# Patient Record
Sex: MALE | Race: WHITE | NOT HISPANIC OR LATINO | Employment: FULL TIME | URBAN - METROPOLITAN AREA
[De-identification: names, ages, dates, MRNs, and addresses within clinical notes are randomized per-mention and may not be internally consistent; named-entity substitution may affect disease eponyms.]

---

## 2023-11-24 PROBLEM — E66.812 CLASS 2 SEVERE OBESITY WITH SERIOUS COMORBIDITY AND BODY MASS INDEX (BMI) OF 37.0 TO 37.9 IN ADULT: Status: ACTIVE | Noted: 2023-05-03

## 2024-07-31 ENCOUNTER — OFFICE VISIT (OUTPATIENT)
Dept: FAMILY MEDICINE | Facility: CLINIC | Age: 54
End: 2024-07-31
Payer: COMMERCIAL

## 2024-07-31 VITALS
OXYGEN SATURATION: 99 % | WEIGHT: 210.56 LBS | SYSTOLIC BLOOD PRESSURE: 118 MMHG | DIASTOLIC BLOOD PRESSURE: 82 MMHG | RESPIRATION RATE: 16 BRPM | HEIGHT: 67 IN | BODY MASS INDEX: 33.05 KG/M2 | HEART RATE: 71 BPM

## 2024-07-31 DIAGNOSIS — Z12.5 SCREENING PSA (PROSTATE SPECIFIC ANTIGEN): ICD-10-CM

## 2024-07-31 DIAGNOSIS — R79.89 LOW TESTOSTERONE IN MALE: Primary | ICD-10-CM

## 2024-07-31 DIAGNOSIS — E03.8 SUBCLINICAL HYPOTHYROIDISM: ICD-10-CM

## 2024-07-31 DIAGNOSIS — Z13.220 ENCOUNTER FOR LIPID SCREENING FOR CARDIOVASCULAR DISEASE: ICD-10-CM

## 2024-07-31 DIAGNOSIS — G37.9 DEMYELINATING DISEASE OF CENTRAL NERVOUS SYSTEM, UNSPECIFIED: ICD-10-CM

## 2024-07-31 DIAGNOSIS — E66.9 CLASS 1 OBESITY WITH SERIOUS COMORBIDITY AND BODY MASS INDEX (BMI) OF 32.0 TO 32.9 IN ADULT, UNSPECIFIED OBESITY TYPE: ICD-10-CM

## 2024-07-31 DIAGNOSIS — Z13.6 ENCOUNTER FOR LIPID SCREENING FOR CARDIOVASCULAR DISEASE: ICD-10-CM

## 2024-07-31 DIAGNOSIS — M54.16 LUMBAR RADICULOPATHY, CHRONIC: ICD-10-CM

## 2024-07-31 DIAGNOSIS — I83.891 VARICOSE VEINS OF LEG WITH SWELLING, RIGHT: ICD-10-CM

## 2024-07-31 DIAGNOSIS — R73.03 PREDIABETES: ICD-10-CM

## 2024-07-31 DIAGNOSIS — J30.89 NON-SEASONAL ALLERGIC RHINITIS, UNSPECIFIED TRIGGER: ICD-10-CM

## 2024-07-31 DIAGNOSIS — E55.9 VITAMIN D DEFICIENCY: ICD-10-CM

## 2024-07-31 DIAGNOSIS — R22.41 LOCALIZED SWELLING OF RIGHT LOWER LEG: ICD-10-CM

## 2024-07-31 PROBLEM — E66.812 CLASS 2 SEVERE OBESITY WITH SERIOUS COMORBIDITY AND BODY MASS INDEX (BMI) OF 37.0 TO 37.9 IN ADULT: Status: RESOLVED | Noted: 2023-05-03 | Resolved: 2024-07-31

## 2024-07-31 PROBLEM — E66.01 CLASS 2 SEVERE OBESITY WITH SERIOUS COMORBIDITY AND BODY MASS INDEX (BMI) OF 37.0 TO 37.9 IN ADULT: Status: RESOLVED | Noted: 2023-05-03 | Resolved: 2024-07-31

## 2024-07-31 PROCEDURE — 1159F MED LIST DOCD IN RCRD: CPT | Mod: CPTII,S$GLB,, | Performed by: STUDENT IN AN ORGANIZED HEALTH CARE EDUCATION/TRAINING PROGRAM

## 2024-07-31 PROCEDURE — 3074F SYST BP LT 130 MM HG: CPT | Mod: CPTII,S$GLB,, | Performed by: STUDENT IN AN ORGANIZED HEALTH CARE EDUCATION/TRAINING PROGRAM

## 2024-07-31 PROCEDURE — 99999 PR PBB SHADOW E&M-EST. PATIENT-LVL IV: CPT | Mod: PBBFAC,,, | Performed by: STUDENT IN AN ORGANIZED HEALTH CARE EDUCATION/TRAINING PROGRAM

## 2024-07-31 PROCEDURE — 3008F BODY MASS INDEX DOCD: CPT | Mod: CPTII,S$GLB,, | Performed by: STUDENT IN AN ORGANIZED HEALTH CARE EDUCATION/TRAINING PROGRAM

## 2024-07-31 PROCEDURE — 3079F DIAST BP 80-89 MM HG: CPT | Mod: CPTII,S$GLB,, | Performed by: STUDENT IN AN ORGANIZED HEALTH CARE EDUCATION/TRAINING PROGRAM

## 2024-07-31 PROCEDURE — 99214 OFFICE O/P EST MOD 30 MIN: CPT | Mod: S$GLB,,, | Performed by: STUDENT IN AN ORGANIZED HEALTH CARE EDUCATION/TRAINING PROGRAM

## 2024-07-31 RX ORDER — FLUTICASONE PROPIONATE 50 MCG
1 SPRAY, SUSPENSION (ML) NASAL DAILY
Qty: 16 G | Refills: 2 | Status: SHIPPED | OUTPATIENT
Start: 2024-07-31

## 2024-07-31 NOTE — PROGRESS NOTES
Subjective:       Patient ID: Antonio Au Jr is a 54 y.o. male.    Chief Complaint: Follow-up (Patient is here today for a follow up visit. )    Pt here for follow-up    Testosterone def: Pt is doing well with meds. He is due for labs.     Obesity/pre-DM: he lost 26lb since last visit. Last A1c 5.6     Vit D def: Pt currently on vit D supplements.    Chronic lumbar back pain. He has a history of transverse myelitis. He is followed by neurosurgery. He takes gabapentin 100mg TID, failed lyrica.    He has a tender area on his right inner thigh just above the knee. He has a varicose vein in the area. It is painful to touch and if he gets up after prolonged sitting. No erythema or warmth to touch. It has been this way for about 1 month.     Review of Systems   Constitutional:  Negative for chills and fever.   HENT:  Negative for congestion and sore throat.    Respiratory:  Negative for cough and shortness of breath.    Cardiovascular:  Negative for chest pain.   Gastrointestinal:  Negative for abdominal pain, diarrhea, nausea and vomiting.   Genitourinary:  Negative for dysuria and hematuria.   Musculoskeletal:  Positive for arthralgias.   Neurological:  Positive for numbness. Negative for dizziness, syncope and light-headedness.       Objective:      Physical Exam  Vitals reviewed.   Constitutional:       General: He is not in acute distress.  HENT:      Head: Normocephalic and atraumatic.      Mouth/Throat:      Mouth: Mucous membranes are moist.   Eyes:      Conjunctiva/sclera: Conjunctivae normal.   Cardiovascular:      Rate and Rhythm: Normal rate and regular rhythm.      Heart sounds: Normal heart sounds. No murmur heard.  Pulmonary:      Effort: Pulmonary effort is normal. No respiratory distress.      Breath sounds: Normal breath sounds.   Musculoskeletal:         General: No deformity.      Cervical back: Neck supple.      Comments: Suspected palpable venous cord right inner thigh just superior to knee    Neurological:      Mental Status: He is alert and oriented to person, place, and time.   Psychiatric:         Mood and Affect: Mood normal.         Behavior: Behavior normal.         Assessment:       1. Low testosterone in male    2. Lumbar radiculopathy, chronic    3. Vitamin D deficiency    4. Class 1 obesity with serious comorbidity and body mass index (BMI) of 32.0 to 32.9 in adult, unspecified obesity type    5. Prediabetes    6. Encounter for lipid screening for cardiovascular disease    7. Screening PSA (prostate specific antigen)    8. Subclinical hypothyroidism    9. Demyelinating disease of central nervous system, unspecified    10. Localized swelling of right lower leg    11. Varicose veins of leg with swelling, right    12. Non-seasonal allergic rhinitis, unspecified trigger          Plan:           1. Low testosterone in male  -     CBC Auto Differential; Future; Expected date: 07/31/2024  -     Comprehensive Metabolic Panel; Future; Expected date: 07/31/2024  -     TSH; Future; Expected date: 07/31/2024  -     Testosterone; Future; Expected date: 07/31/2024    2. Lumbar radiculopathy, chronic    3. Vitamin D deficiency  -     CBC Auto Differential; Future; Expected date: 07/31/2024  -     Comprehensive Metabolic Panel; Future; Expected date: 07/31/2024  -     TSH; Future; Expected date: 07/31/2024  -     Vitamin D; Future; Expected date: 07/31/2024    4. Class 1 obesity with serious comorbidity and body mass index (BMI) of 32.0 to 32.9 in adult, unspecified obesity type  -     Hemoglobin A1C; Future; Expected date: 07/31/2024  -     Lipid Panel; Future; Expected date: 07/31/2024  -     TSH; Future; Expected date: 07/31/2024    5. Prediabetes  -     CBC Auto Differential; Future; Expected date: 07/31/2024  -     Comprehensive Metabolic Panel; Future; Expected date: 07/31/2024  -     Hemoglobin A1C; Future; Expected date: 07/31/2024  -     Lipid Panel; Future; Expected date: 07/31/2024    6. Encounter  for lipid screening for cardiovascular disease  -     Lipid Panel; Future; Expected date: 07/31/2024    7. Screening PSA (prostate specific antigen)  -     PSA, Screening; Future; Expected date: 07/31/2024    8. Subclinical hypothyroidism  -     CBC Auto Differential; Future; Expected date: 07/31/2024  -     Comprehensive Metabolic Panel; Future; Expected date: 07/31/2024  -     TSH; Future; Expected date: 07/31/2024    9. Demyelinating disease of central nervous system, unspecified    10. Localized swelling of right lower leg  -     US Lower Extremity Veins Right; Future; Expected date: 07/31/2024    11. Varicose veins of leg with swelling, right  -     US Lower Extremity Veins Right; Future; Expected date: 07/31/2024    12. Non-seasonal allergic rhinitis, unspecified trigger  -     fluticasone propionate (FLONASE) 50 mcg/actuation nasal spray; Use 1 spray (50 mcg total) by Each Nostril route once daily.  Dispense: 16 g; Refill: 2      Labs as ordered  Cont current meds; add flonase  Check venous US to r/o DVT  Follow-up neurosurgery as scheduled  RTC 6 months or sooner if needed

## 2024-08-01 ENCOUNTER — HOSPITAL ENCOUNTER (OUTPATIENT)
Dept: RADIOLOGY | Facility: HOSPITAL | Age: 54
Discharge: HOME | End: 2024-08-01
Attending: STUDENT IN AN ORGANIZED HEALTH CARE EDUCATION/TRAINING PROGRAM
Payer: COMMERCIAL

## 2024-08-01 DIAGNOSIS — M71.21 POPLITEAL CYST, RIGHT: Primary | ICD-10-CM

## 2024-08-01 DIAGNOSIS — I83.891 VARICOSE VEINS OF LEG WITH SWELLING, RIGHT: ICD-10-CM

## 2024-08-01 DIAGNOSIS — R22.41 LOCALIZED SWELLING OF RIGHT LOWER LEG: ICD-10-CM

## 2024-08-01 PROCEDURE — 93971 EXTREMITY STUDY: CPT | Mod: TC,RT

## 2024-08-01 NOTE — PROGRESS NOTES
Please inform the pt US did not show any bloot clot. He does have a popliteal cyst behind the right knee which is sometimes drained by ortho. Would he like me to place a referral?

## 2024-08-02 ENCOUNTER — TELEPHONE (OUTPATIENT)
Dept: FAMILY MEDICINE | Facility: CLINIC | Age: 54
End: 2024-08-02
Payer: COMMERCIAL

## 2024-08-09 DIAGNOSIS — M25.561 ACUTE PAIN OF RIGHT KNEE: Primary | ICD-10-CM

## 2024-08-14 ENCOUNTER — HOSPITAL ENCOUNTER (OUTPATIENT)
Dept: RADIOLOGY | Facility: HOSPITAL | Age: 54
Discharge: HOME | End: 2024-08-14
Attending: NURSE PRACTITIONER
Payer: COMMERCIAL

## 2024-08-14 DIAGNOSIS — M25.561 ACUTE PAIN OF RIGHT KNEE: ICD-10-CM

## 2024-08-14 PROCEDURE — 73562 X-RAY EXAM OF KNEE 3: CPT | Mod: TC,RT

## 2024-08-15 ENCOUNTER — OFFICE VISIT (OUTPATIENT)
Dept: ORTHOPEDICS | Facility: CLINIC | Age: 54
End: 2024-08-15
Payer: COMMERCIAL

## 2024-08-15 DIAGNOSIS — R22.41 LOCALIZED SWELLING, MASS AND LUMP, RIGHT LOWER LIMB: ICD-10-CM

## 2024-08-15 DIAGNOSIS — M25.561 ACUTE PAIN OF RIGHT KNEE: Primary | ICD-10-CM

## 2024-08-15 PROCEDURE — 1159F MED LIST DOCD IN RCRD: CPT | Mod: CPTII,S$GLB,, | Performed by: NURSE PRACTITIONER

## 2024-08-15 PROCEDURE — 1160F RVW MEDS BY RX/DR IN RCRD: CPT | Mod: CPTII,S$GLB,, | Performed by: NURSE PRACTITIONER

## 2024-08-15 PROCEDURE — 3044F HG A1C LEVEL LT 7.0%: CPT | Mod: CPTII,S$GLB,, | Performed by: NURSE PRACTITIONER

## 2024-08-15 PROCEDURE — 99999 PR PBB SHADOW E&M-EST. PATIENT-LVL II: CPT | Mod: PBBFAC,,, | Performed by: NURSE PRACTITIONER

## 2024-08-15 PROCEDURE — 99203 OFFICE O/P NEW LOW 30 MIN: CPT | Mod: S$GLB,,, | Performed by: NURSE PRACTITIONER

## 2024-08-15 NOTE — PROGRESS NOTES
Subjective:     Antonio Au Jr. is a 54 y.o. male presents for evaluation and treatment for a right knee mass/swelling. He is self referred.  He states onset has been over the past 6 months or so. He states that he notes an enlarging soft tissue mass over the medial posterior knee. He states that he was not having much pain with early onset but the knee has been painful over the past month in the area of the swelling in the medial posterior knee. He had a previous ultrasound done on 08/01/24 showing changes consistent with a bakers cyst with no DVT seen.  He presents and rates his pain as 5/10. The pain's location is over the medial posterior knee. He states that he has a medial protrusion that has enlarged over the past few weeks. He reports increased pain with flexion. He describes the symptoms as throbbing. He states that the symptoms worsen with activity, ROM and weight bearing. He denies any locking. The patient can bend and straighten the knee fully due to pain. He is noted with a limp.      Outside reports reviewed: previous ultrasound     Medical History:  Past Medical History:   Diagnosis Date    Transverse myelitis        Surgical History:  No past surgical history on file.    Family History:  Family History   Problem Relation Name Age of Onset    Cancer Mother          lymphoma    Coronary artery disease Mother      Cancer Father          spinal cancer    Heart disease Father          triple bypass       Allergies:   Review of patient's allergies indicates:  No Known Allergies        Review of Systems   Constitutional: Negative.  Negative for fever.   Musculoskeletal:  Positive for RT knee joint pain.   Skin: Negative.    Neurological: Negative.    Psychiatric/Behavioral: Negative.     All other systems reviewed and are negative.      Objective:      NVI  General :   alert, appears stated age, and cooperative   Gait: Antalgic   Right Lower Extremity  Hip Palpation:  no tenderness over the greater  trochanters   Hip ROM: 100% of normal    Knee Effusion:  trace   swelling:  Posterior medial knee soft tissue swelling/mass. Mass protruded with flexion over the medial knee.    Knee ROM:  5 to 115 degrees with mild subpatellar crepitance. Pain with forced flexion-posterior.    Patella:  Patella does track normally.  Patellar apprehension test: negative  Patellar compression test: negative   Tenderness: Medial knee joint line, posterior   Stability:  Lachman's test: negative  Posterior drawer: negative  Medial collateral ligament: negative  Lateral collateral ligament: negative     Sangeeta Test:  negative   Natasha's Test:  Guarded-   Sensation:   intact to light touch   Pulses: normal DP and PT pulses.       Contralateral knee was without deficit.  Brisk cap refill.      Imaging    Us RT lower extremity done 08/01/24:  The common femoral, superficial femoral and popliteal veins show normal flow, compression and augmentation.  5.7 x 1.4 x 3 cm popliteal cyst    Radiographs RT knee 2 V was ordered and reviewed from today  No fracture. Joint spaces are maintained. Minimal medial compartment osteophytes. No bone lesion, erosion or periosteal reaction detected     Assessment:      Right knee pain, bakers cyst vs other swelling/mass     Plan:         Radiographs were negative. Previous ultrasound- suspected bakers cyst. Will get MRI RT Knee to evaluate the etiology of the soft tissue swelling/mass over the medial posterior knee.   2.   Start directed physician guided exercises for ROM and strengthening for the RT knee.  3.   Ice and compression recommended.   4.   WBAT. Limit Stairs and squatting.   5.   Ibuprofen as directed.   6.   Follow up: Post MRI for review.   7.   He had no further questions.

## 2024-08-16 ENCOUNTER — TELEPHONE (OUTPATIENT)
Dept: ORTHOPEDICS | Facility: CLINIC | Age: 54
End: 2024-08-16
Payer: COMMERCIAL

## 2024-09-19 ENCOUNTER — HOSPITAL ENCOUNTER (OUTPATIENT)
Dept: RADIOLOGY | Facility: HOSPITAL | Age: 54
Discharge: HOME | End: 2024-09-19
Attending: NURSE PRACTITIONER
Payer: COMMERCIAL

## 2024-09-19 DIAGNOSIS — R22.41 LOCALIZED SWELLING, MASS AND LUMP, RIGHT LOWER LIMB: ICD-10-CM

## 2024-09-19 DIAGNOSIS — M25.561 ACUTE PAIN OF RIGHT KNEE: ICD-10-CM

## 2024-09-19 PROCEDURE — 73721 MRI JNT OF LWR EXTRE W/O DYE: CPT | Mod: TC,RT

## 2024-10-22 ENCOUNTER — TELEPHONE (OUTPATIENT)
Dept: ORTHOPEDICS | Facility: CLINIC | Age: 54
End: 2024-10-22
Payer: COMMERCIAL

## 2024-10-22 NOTE — TELEPHONE ENCOUNTER
Pt called in regards to his MRI results and discussed. He states that he will continue current treatment plan. He will notify me if symptoms worsen.

## 2024-11-07 DIAGNOSIS — M54.16 LUMBAR RADICULOPATHY, CHRONIC: ICD-10-CM

## 2024-11-07 DIAGNOSIS — R79.89 LOW TESTOSTERONE IN MALE: ICD-10-CM

## 2024-11-07 RX ORDER — TESTOSTERONE CYPIONATE 200 MG/ML
100 INJECTION, SOLUTION INTRAMUSCULAR
Qty: 1 ML | Refills: 2 | Status: SHIPPED | OUTPATIENT
Start: 2024-11-07 | End: 2025-05-08

## 2024-11-07 RX ORDER — GABAPENTIN 100 MG/1
100 CAPSULE ORAL 3 TIMES DAILY
Qty: 90 CAPSULE | Refills: 5 | Status: SHIPPED | OUTPATIENT
Start: 2024-11-07 | End: 2025-05-06

## 2024-11-07 NOTE — TELEPHONE ENCOUNTER
No care due was identified.  Health [facility] Embedded Care Due Messages. Reference number: [id number].   11/07/2024 11:42:44 AM CST

## 2024-11-07 NOTE — TELEPHONE ENCOUNTER
No care due was identified.  Health [facility] Embedded Care Due Messages. Reference number: [id number].   11/07/2024 11:38:23 AM CST

## 2024-11-08 ENCOUNTER — TELEPHONE (OUTPATIENT)
Dept: FAMILY MEDICINE | Facility: CLINIC | Age: 54
End: 2024-11-08
Payer: COMMERCIAL

## 2024-11-08 ENCOUNTER — OFFICE VISIT (OUTPATIENT)
Dept: ORTHOPEDICS | Facility: CLINIC | Age: 54
End: 2024-11-08
Payer: COMMERCIAL

## 2024-11-08 VITALS
WEIGHT: 210.56 LBS | SYSTOLIC BLOOD PRESSURE: 136 MMHG | DIASTOLIC BLOOD PRESSURE: 80 MMHG | BODY MASS INDEX: 33.05 KG/M2 | HEIGHT: 67 IN | OXYGEN SATURATION: 96 % | HEART RATE: 67 BPM

## 2024-11-08 DIAGNOSIS — M71.21 BAKER'S CYST OF KNEE, RIGHT: ICD-10-CM

## 2024-11-08 DIAGNOSIS — M25.461 KNEE EFFUSION, RIGHT: ICD-10-CM

## 2024-11-08 DIAGNOSIS — M25.561 ACUTE PAIN OF RIGHT KNEE: Primary | ICD-10-CM

## 2024-11-08 DIAGNOSIS — S83.241A ACUTE MEDIAL MENISCUS TEAR OF RIGHT KNEE, INITIAL ENCOUNTER: ICD-10-CM

## 2024-11-08 PROCEDURE — 99999 PR PBB SHADOW E&M-EST. PATIENT-LVL III: CPT | Mod: PBBFAC,,, | Performed by: NURSE PRACTITIONER

## 2024-11-08 RX ORDER — TRIAMCINOLONE ACETONIDE 40 MG/ML
40 INJECTION, SUSPENSION INTRA-ARTICULAR; INTRAMUSCULAR
Status: DISCONTINUED | OUTPATIENT
Start: 2024-11-08 | End: 2024-11-08 | Stop reason: HOSPADM

## 2024-11-08 RX ADMIN — TRIAMCINOLONE ACETONIDE 40 MG: 40 INJECTION, SUSPENSION INTRA-ARTICULAR; INTRAMUSCULAR at 08:11

## 2024-11-08 NOTE — PROCEDURES
Large Joint Aspiration/Injection: R knee    Date/Time: 11/8/2024 8:15 AM    Performed by: Yassine Herrrea NP  Authorized by: Yassine Herrera NP    Consent Done?:  Yes (Written)  Indications:  Pain and joint swelling  Prep: patient was prepped and draped in usual sterile fashion    Local anesthesia used?: No    Local anesthetic:  Lidocaine 1% without epinephrine  Anesthetic total (ml):  2      Details:  Needle Size:  22 G  Ultrasonic Guidance for needle placement?: No    Approach:  Lateral  Location:  Knee  Site:  R knee  Medications:  40 mg triamcinolone acetonide 40 mg/mL  Aspirate amount (mL):  3  Aspirate:  Clear  Patient tolerance:  Patient tolerated the procedure well with no immediate complications     Risks reviewed: pain, swelling, infection and nerve or tendon injury.     YANY Newman MA in room

## 2024-11-08 NOTE — TELEPHONE ENCOUNTER
----- Message from Luis sent at 2024 12:36 PM CST -----  Contact: self  Antonio Au Jr.  MRN: [id number]  : 1970  PCP: Serg Neves  Home Phone      Not on file.  Work Phone      Not on file.  Mobile          [phone]  Home Phone      Not on file.  Mobile          Not on file.      MESSAGE:   Pt states [facility]  ARON needs a P.A for his triamcinolone acetonide injection 40 mg

## 2024-11-08 NOTE — PROGRESS NOTES
Subjective:      Follow up: RT knee pain/MRI    Antonio Au Jr. is a 54 y.o. male presents for follow up for right knee pain, swelling and had a recent MRI showing a medial meniscal tear. He presents and states that the knee was doing better but twisted it recently. He is again having pain and localized swelling over the posterior knee. He rates his pain as 4/10. The pain's location is over the medial posterior knee. He reports increased pain with flexion. He states that the symptoms worsen with activity, ROM and weight bearing. He denies any locking. He is noted with a limp.      Outside reports reviewed: previous ultrasound negative     Medical History:       Past Medical History:   Diagnosis Date    Transverse myelitis           Surgical History:  No past surgical history on file.     Family History:         Family History   Problem Relation Name Age of Onset    Cancer Mother             lymphoma    Coronary artery disease Mother        Cancer Father             spinal cancer    Heart disease Father             triple bypass         Allergies:   Review of patient's allergies indicates:  No Known Allergies        Review of Systems   Constitutional: Negative.  Negative for fever.   Musculoskeletal:  Positive for RT knee joint pain.   Skin: Negative.    Neurological: Negative.    Psychiatric/Behavioral: Negative.     All other systems reviewed and are negative.      Objective:      NVI  General :   alert, appears stated age, and cooperative   Gait: Antalgic   Right Lower Extremity  Hip Palpation:  no tenderness over the greater trochanters   Hip ROM: 100% of normal    Knee Effusion:  trace   swelling:  Large bakers cyst   Knee ROM:  5 to 115 degrees with mild subpatellar crepitance.   Pain with forced flexion-posterior.    Patella:  Patella does track normally.  Patellar apprehension test: negative  Patellar compression test: negative   Tenderness: Medial knee joint line, posterior   Stability:  Lachman's test:  negative  Posterior drawer: negative  Medial collateral ligament: negative  Lateral collateral ligament: negative     Sangeeta Test:  negative   Natasha's Test:  Guarded- medial knee   Sensation:   intact to light touch   Pulses: normal DP and PT pulses.       Contralateral knee was without deficit.  Brisk cap refill.      Imaging     Us RT lower extremity done 08/01/24:  The common femoral, superficial femoral and popliteal veins show normal flow, compression and augmentation.  5.7 x 1.4 x 3 cm popliteal cyst     Radiographs RT knee 2 V was reviewed from 08/14/24  No fracture. Joint spaces are maintained. Minimal medial compartment osteophytes. No bone lesion, erosion or periosteal reaction detected     RT knee MRI reviewed from 09/19/24  Horizontal tear throughout body and posterior horn medial meniscus.     Intact lateral meniscus, ACL and PCL.  Intact MCL and LCL.  Intact quadriceps and patellar tendons.  No cartilage defects or bone marrow edema.  6 x 3 x 2 cm popliteal cyst and minimal joint effusion.     Impression:     Horizontal body-posterior horn medial meniscal tear     Small joint effusion, 6 cm popliteal cyst     Assessment:      Right knee pain, bakers cyst and medial meniscal tear     Plan:         Previous MRI RT Knee consistent with a medial meniscal tear and bakers cyst.   RT Knee steroid injection done, see procedure note.   3.   Referral to sports medicine for surgical evaluation.   4.   Continue directed physician guided exercises for ROM and strengthening for the RT knee.  5.   Ice and compression recommended.   6.   WBAT. Limit Stairs and squatting.   7.   Ibuprofen as previous.    8.   Follow up: Prn.  9.   He had no further questions.     PROCEDURE: RT Knee steroid injection  I have explained the risks, benefits, and alternatives of the procedure in detail.  The patient voices understanding and all questions have been answered.  The patient agrees to proceed as planned. So after I performed a  sterile prep of the skin in the normal fashion the right knee is injected using a 22 gauge needle from the lateral approach with a combination of 3cc 1% plain lidocaine and 40 mg of kenalog  The patient is cautioned and immediate relief of pain is secondary to the local anesthetic and will be temporary.  After the anesthetic wears off there may be a increase in pain that may last for a few hours or a few days and they should use ice to help alleviate this flair up of pain.

## 2024-11-15 ENCOUNTER — TELEPHONE (OUTPATIENT)
Dept: SPORTS MEDICINE | Facility: CLINIC | Age: 54
End: 2024-11-15
Payer: COMMERCIAL

## 2024-11-15 NOTE — TELEPHONE ENCOUNTER
----- Message from Raymundo sent at 11/15/2024  1:41 PM CST -----  Regarding: RE: referral  Lvm x2  ----- Message -----  From: Suzan Lyons MA  Sent: 11/15/2024   1:34 PM CST  To: Raymundo Beth  Subject: FW: referral                                       ----- Message -----  From: Serg Espinosa  Sent: 11/15/2024   6:50 AM CST  To: Suzan Lyons MA  Subject: FW: referral                                     [facility]  ----- Message -----  From: Yassine Herrera NP  Sent: 11/8/2024   9:15 AM CST  To: Serg Espinosa  Subject: referral                                         RT knee  Mri - medial meniscal tear and large bakers cyst

## 2024-12-06 ENCOUNTER — TELEPHONE (OUTPATIENT)
Dept: SPORTS MEDICINE | Facility: CLINIC | Age: 54
End: 2024-12-06
Payer: COMMERCIAL

## 2024-12-06 NOTE — TELEPHONE ENCOUNTER
----- Message from Efrain Mares sent at 12/5/2024  3:49 PM CST -----    ----- Message -----  From: Caro Mae  Sent: 12/5/2024   3:36 PM CST  To: Swapnil SHARIF Staff    Type:  Patient Returning Call    Who Called: pt  Who Left Message for Patient: Suzan Lyons MA  Does the patient know what this is regarding?: yes  Would the patient rather a call back or a response via [facility]? call  Best Call Back Number:  [phone]  Additional Information:

## 2024-12-19 DIAGNOSIS — R79.89 LOW TESTOSTERONE IN MALE: ICD-10-CM

## 2024-12-19 DIAGNOSIS — E55.9 VITAMIN D DEFICIENCY: ICD-10-CM

## 2024-12-19 RX ORDER — TESTOSTERONE CYPIONATE 200 MG/ML
100 INJECTION, SOLUTION INTRAMUSCULAR
Qty: 1 ML | Refills: 1 | Status: SHIPPED | OUTPATIENT
Start: 2024-12-19 | End: 2025-06-19

## 2024-12-19 NOTE — TELEPHONE ENCOUNTER
No care due was identified.  [facility] Embedded Care Due Messages. Reference number: [id number].   12/19/2024 6:51:48 AM CST

## 2024-12-19 NOTE — TELEPHONE ENCOUNTER
No care due was identified.  Health [facility] Embedded Care Due Messages. Reference number: [id number].   12/19/2024 8:19:11 AM CST

## 2024-12-22 RX ORDER — ERGOCALCIFEROL 1.25 MG/1
50000 CAPSULE ORAL EVERY OTHER DAY
Qty: 15 CAPSULE | Refills: 2 | Status: SHIPPED | OUTPATIENT
Start: 2024-12-22

## 2025-01-30 ENCOUNTER — OFFICE VISIT (OUTPATIENT)
Dept: SPORTS MEDICINE | Facility: CLINIC | Age: 55
End: 2025-01-30
Payer: COMMERCIAL

## 2025-01-30 DIAGNOSIS — M25.561 CHRONIC PAIN OF RIGHT KNEE: ICD-10-CM

## 2025-01-30 DIAGNOSIS — M25.561 POSTERIOR KNEE PAIN, RIGHT: ICD-10-CM

## 2025-01-30 DIAGNOSIS — G89.29 CHRONIC PAIN OF RIGHT KNEE: ICD-10-CM

## 2025-01-30 DIAGNOSIS — M17.11 PRIMARY OSTEOARTHRITIS OF RIGHT KNEE: Primary | ICD-10-CM

## 2025-01-30 RX ADMIN — TRIAMCINOLONE ACETONIDE 40 MG: 40 INJECTION, SUSPENSION INTRA-ARTICULAR; INTRAMUSCULAR at 01:01

## 2025-01-30 NOTE — PROGRESS NOTES
HISTORY OF PRESENT ILLNESS   Presents today for evaluation of his right KNEE.    Patient reports onset of chronic pain beginning 4 months ago, but has been on and off for over 1 year. Patient reports no known injury or trauma, but fell 2 months ago. Pain is located along posterior aspect of KNEE. Pain is 3/10 at present & up to 8/10 with provacative activity including prolonged walking. Pain is described as sharp. Patient states pain does not radiate.     Associated symptoms include swelling. Pain is aggravated by activities above & occur daily . Symptoms do interfere with sleep. Patient reports pain & symptoms are staying the same . Patient reports no prior surgery to KNEE.     Prior treatment Armando Morris has tried   OTC Acetaminophen - No  OTC NSAID - Yes - Ibuprofen    Rx NSAID - Yes - gabapentin   Rx Narcotic/Other - No   Brace - No   Injection - Cortisone - Yes - Dr. Bowman in November with some relief.   Injection - Viscosupplementation - No  -  Injection - Biologics - No   Activity Modification - Yes  Physical Therapy - No   Home Exercise Program - No  Assistive Device - No  Other - ice    Review of Systems (ROS)  A 10+ review of systems was performed with pertinent positives and negatives noted above in the history of present illness. Other systems were negative unless otherwise specified.    Physical Examination (PE)  General:  The patient is alert and oriented x 3. Mood is pleasant. Observation of ears, eyes and nose reveal no gross abnormalities. HEENT: NCAT, sclera anicteric.   Lungs: Respirations are equal and unlabored.  Gait is coordinated. Patient can toe walk and heel walk without difficulty.    KNEE EXAMINATION    Observation/Inspection  Gait:   Antalgic   Alignment:  Neutral   Scars:   None   Muscle atrophy: Mild  Effusion:  None   Warmth:  None   Discoloration:   none     Tenderness / Crepitus (T / C):         T / C      T / C  Patella   - / -   Lateral joint line   - / -     Peripatellar medial   -  Medial joint line    + / -  Peripatellar lateral -  Medial plica   - / -  Patellar tendon -   Popliteal fossa   - / -  Quad tendon   -   Gastrocnemius   -  Prepatellar Bursa - / -   Quadricep   -  Tibial tubercle  -  Thigh/hamstring  -  Pes anserine/HS -  Fibula    -  ITB   - / -  Tibia     -  Tib/fib joint  - / -  LCL    -    MFC   - / -   MCL: Proximal  -    LFC   - / -   Distal    -          ROM: (* = pain)  PASSIVE   ACTIVE    Left :   5 / 0 / 145   5 / 0 / 145     Right :    5 / 0 / 145   5 / 0 / 145    Patellofemoral examination:  See above noted areas of tenderness.   Patella position    Subluxation / dislocation: Centered        Sup. / Inf;   Normal   Crepitus (PF):    Absent   Patellar Mobility:       Medial-lateral:   Normal    Superior-inferior:  Normal    Inferior tilt   Normal    Patellar tendon:  Normal   Lateral tilt:    Normal   J-sign:     None   Patellofemoral grind:   No pain     Meniscal Signs:     Pain on terminal extension:  +  Pain on terminal flexion:  +  Natashas maneuver:  +*  Squat     NT  Thesaly    NT    Ligament Examination:  ACL / Lachman:  WNL  PCL-Post.  drawer: normal 0 to 2mm  MCL- Valgus:  normal 0 to 2mm  LCL- Varus:    normal 0 to 2mm  Pivot shift:  guarding   Dial Test:   difference c/w other side   At 30° flexion: normal (< 5°)    At 90° flexion: normal (< 5°)   Reverse Pivot Shift:   normal (Equal)     Strength: (* = with pain) Painful Side  Quadriceps   5/5  Hamstrin/5    Extremity Neuro-vascular Examination:   Sensation:  Grossly intact to light touch all dermatomal regions.   Motor Function:  Fully intact motor function at hip, knee, foot and ankle    DTRs;  quadriceps and  achilles 2+.  No clonus and downgoing Babinski.    Vascular status:  DP and PT pulses 2+, brisk capillary refill, symmetric.     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, right  W/ med menis tear    No evidence of neurologic  pathology  No evidence of vascular pathology    Imaging studies reviewed:   X-ray knee, right, 24.08  MRI knee right 24.09    Plan  We discussed the importance of appropriate diet, weight, and regular exercise    We discussed options including    Watchful waiting / relative rest    Physical therapy x   Injection therapy Csi iaknee right   Consultation    The patient chooses As above   x = prescribed  CSI = corticosteroid injection  VSI = viscosupplement injection  PRPI = platelet rich plasma injection  ia = intra articular  R = right  L = left  B = bilateral   nfSx = surgical consultation was recommended, but patient is not interested in consultation at this time    Physical Therapy        Formal (fPT), @ [facility] facility b   Formal (fPT), @ [facility] facility        Homegoing (hgPT), per concurrent fPT recommendations    Homegoing (hgPT), per prior fPT recommendations    Homegoing (hgPT), handout provided        w/  (atPT)    [blank] = not prescribed  x = prescribed  b = prescribed, and begin as indicated  t = continue as indicated  r = prescribed, and restart as indicated  p = completed prior as indicated  hs = prescribed, and with high school   col = prescribed, and with college or university   nfPT = physical therapy was recommended, but patient is not interested in PT at this time    Activity (e.g. sports, work) restrictions    [blank] = as tolerated  pt = per physical therapist  at = per   NWB = non weight bearing on affected lower extremity, with crutches assistance for ambulation    Bracing    [blank] = not prescribed  r = recommended, but not fit with at todays visit  f = prescribed and fit with at todays visit  t = continue as indicated  d = d/c  p = as needed  rare = use on rare, as-needed basis; advised against chronic use    Pain management    [blank] = No prescription necessary. A handout detailing dosing of appropriate   over-the-counter  musculoskeletal analgesics was made available to the patient.   m = meloxicam x 14 days  mp = 14 day course of meloxicam prescribed prior    Follow up 10 days via [facility]    [blank] = as needed  [number] = in [number] weeks  CSI = for corticosteroid injection  VSI = for viscosupplement injection or injection series  PRP = for platelet rich plasma injection or injection series  MRI = after MRI imaging  ns = should surgical options be deferred (no surgery)  o = appointment offered, deferred by patient    Should symptoms worsen or fail to resolve, consider    Revisiting the above options and / or VSI vs. scope     Vocation:

## 2025-01-31 DIAGNOSIS — R79.89 LOW TESTOSTERONE IN MALE: ICD-10-CM

## 2025-01-31 DIAGNOSIS — E55.9 VITAMIN D DEFICIENCY: ICD-10-CM

## 2025-01-31 RX ORDER — ERGOCALCIFEROL 1.25 MG/1
50000 CAPSULE ORAL EVERY OTHER DAY
Qty: 15 CAPSULE | Refills: 5 | Status: SHIPPED | OUTPATIENT
Start: 2025-01-31

## 2025-01-31 RX ORDER — TESTOSTERONE CYPIONATE 200 MG/ML
100 INJECTION, SOLUTION INTRAMUSCULAR
Qty: 1 ML | Refills: 1 | Status: SHIPPED | OUTPATIENT
Start: 2025-01-31 | End: 2025-08-01

## 2025-01-31 NOTE — TELEPHONE ENCOUNTER
No care due was identified.  [facility] Embedded Care Due Messages. Reference number: [id number].   1/31/2025 1:38:51 PM CST

## 2025-02-05 ENCOUNTER — PATIENT MESSAGE (OUTPATIENT)
Dept: SPORTS MEDICINE | Facility: CLINIC | Age: 55
End: 2025-02-05
Payer: COMMERCIAL

## 2025-02-20 ENCOUNTER — TELEPHONE (OUTPATIENT)
Dept: SPORTS MEDICINE | Facility: CLINIC | Age: 55
End: 2025-02-20
Payer: COMMERCIAL

## 2025-02-20 RX ORDER — TRIAMCINOLONE ACETONIDE 40 MG/ML
40 INJECTION, SUSPENSION INTRA-ARTICULAR; INTRAMUSCULAR
Status: DISCONTINUED | OUTPATIENT
Start: 2025-01-30 | End: 2025-02-20 | Stop reason: HOSPADM

## 2025-02-20 NOTE — TELEPHONE ENCOUNTER
Touched basis with Mr. Au to let his know Dr. Leon got with physical therapy to schedule his appt, so he can be on the look out for their call.     ----- Message from Geo Leon MD sent at 2/20/2025  3:00 PM CST -----  Thank you. I circled back with physical therapy, which should reach out to him shortly.    ----- Message -----  From: Kelsey Lorenzo MA  Sent: 2/20/2025   2:52 PM CST  To: Geo Leon MD    Mr. Au stated he have a knot on the back of his R leg that is currently swollen. He stated he had a fall shortly after his appointment which caused the swelling. He said he isn't in any pain. He hasn't received a call from the referral to began physical therapy as of yet.

## 2025-02-20 NOTE — TELEPHONE ENCOUNTER
Mr. Au stated he have a knot on the back of his R leg that is currently swollen. He stated he had a fall shortly after his appointment which caused the swelling. He said he isn't in any pain. He hasn't received a call from the referral to began physical therapy as of yet.    ----- Message from Javier sent at 2/20/2025  2:41 PM CST -----  Regarding: Pt Advice  Contact: pt [phone]  Pt is returning call to discuss plan of care, pt states still swelling on back of right leg behind knee, pt states still has not received referral/call for PT, please call pt @[phone]

## 2025-02-20 NOTE — TELEPHONE ENCOUNTER
Called to speak with Mr. Au @ 2:35PM. Didn't get an answer a VM was left stating the reason for the call.     ----- Message from Geo Leon MD sent at 2/20/2025  2:20 PM CST -----  In downtime (I.e. when there aren't any patients in clinic) would you please follow up with the patient to see how s/he is doing   Partial Purse String (Intermediate) Text: Given the location of the defect and the characteristics of the surrounding skin an intermediate purse string closure was deemed most appropriate.  Undermining was performed circumferentially around the surgical defect.  A purse string suture was then placed and tightened. Wound tension of the circular defect prevented complete closure of the wound.

## 2025-02-20 NOTE — PROCEDURES
"Large Joint Aspiration/Injection: R knee    Date/Time: 1/30/2025 1:15 PM    Performed by: Geo Leon MD  Authorized by: Geo Leon MD    Consent Done?:  Yes (Verbal)  Indications:  Pain  Site marked: the procedure site was marked    Timeout: prior to procedure the correct patient, procedure, and site was verified    Prep: patient was prepped and draped in usual sterile fashion      Details:  Needle Size:  25 G  Ultrasonic Guidance for needle placement?: Yes    Images are saved and documented.  Approach:  Lateral  Location:  Knee  Site:  R knee  Medications:  40 mg triamcinolone acetonide 40 mg/mL  Patient tolerance:  Patient tolerated the procedure well with no immediate complications     Description of ultrasound utilization for needle guidance:   Ultrasound guidance used for needle localization. Images saved and stored for documentation. The SUPRAPATELLAR BURSA / KNEE JOINT was visualized. Dynamic visualization of the 25g x 1.5" needle was continuous throughout the procedure.     "

## 2025-04-01 ENCOUNTER — OFFICE VISIT (OUTPATIENT)
Dept: FAMILY MEDICINE | Facility: CLINIC | Age: 55
End: 2025-04-01
Payer: COMMERCIAL

## 2025-04-01 VITALS
OXYGEN SATURATION: 98 % | BODY MASS INDEX: 32.56 KG/M2 | HEART RATE: 67 BPM | HEIGHT: 67 IN | DIASTOLIC BLOOD PRESSURE: 70 MMHG | RESPIRATION RATE: 16 BRPM | WEIGHT: 207.44 LBS | SYSTOLIC BLOOD PRESSURE: 118 MMHG

## 2025-04-01 DIAGNOSIS — R79.89 ABNORMAL TSH: ICD-10-CM

## 2025-04-01 DIAGNOSIS — M54.16 LUMBAR RADICULOPATHY, CHRONIC: ICD-10-CM

## 2025-04-01 DIAGNOSIS — R73.03 PREDIABETES: Primary | ICD-10-CM

## 2025-04-01 DIAGNOSIS — E55.9 VITAMIN D DEFICIENCY: ICD-10-CM

## 2025-04-01 DIAGNOSIS — G37.9 DEMYELINATING DISEASE OF CENTRAL NERVOUS SYSTEM, UNSPECIFIED: ICD-10-CM

## 2025-04-01 DIAGNOSIS — R79.89 LOW TESTOSTERONE IN MALE: ICD-10-CM

## 2025-04-01 PROCEDURE — 1159F MED LIST DOCD IN RCRD: CPT | Mod: CPTII,S$GLB,, | Performed by: STUDENT IN AN ORGANIZED HEALTH CARE EDUCATION/TRAINING PROGRAM

## 2025-04-01 PROCEDURE — 99214 OFFICE O/P EST MOD 30 MIN: CPT | Mod: S$GLB,,, | Performed by: STUDENT IN AN ORGANIZED HEALTH CARE EDUCATION/TRAINING PROGRAM

## 2025-04-01 PROCEDURE — 99999 PR PBB SHADOW E&M-EST. PATIENT-LVL III: CPT | Mod: PBBFAC,,, | Performed by: STUDENT IN AN ORGANIZED HEALTH CARE EDUCATION/TRAINING PROGRAM

## 2025-04-01 PROCEDURE — G2211 COMPLEX E/M VISIT ADD ON: HCPCS | Mod: S$GLB,,, | Performed by: STUDENT IN AN ORGANIZED HEALTH CARE EDUCATION/TRAINING PROGRAM

## 2025-04-01 PROCEDURE — 3078F DIAST BP <80 MM HG: CPT | Mod: CPTII,S$GLB,, | Performed by: STUDENT IN AN ORGANIZED HEALTH CARE EDUCATION/TRAINING PROGRAM

## 2025-04-01 PROCEDURE — 3074F SYST BP LT 130 MM HG: CPT | Mod: CPTII,S$GLB,, | Performed by: STUDENT IN AN ORGANIZED HEALTH CARE EDUCATION/TRAINING PROGRAM

## 2025-04-01 PROCEDURE — 3008F BODY MASS INDEX DOCD: CPT | Mod: CPTII,S$GLB,, | Performed by: STUDENT IN AN ORGANIZED HEALTH CARE EDUCATION/TRAINING PROGRAM

## 2025-04-01 RX ORDER — TESTOSTERONE CYPIONATE 200 MG/ML
100 INJECTION, SOLUTION INTRAMUSCULAR
Qty: 1 ML | Refills: 5 | Status: SHIPPED | OUTPATIENT
Start: 2025-04-01 | End: 2025-09-30

## 2025-04-01 NOTE — PROGRESS NOTES
Subjective:       Patient ID: Antonio Au Jr. is a 55 y.o. male.    Chief Complaint: Follow-up (Patient is here today for a follow up visit. )    Pt here for follow-up    Testosterone def: Pt is doing well with meds. He is due for labs.     Obesity/pre-DM: he lost 3lb since last visit. Last A1c 5.7     Vit D def: Pt currently on vit D supplements.    Chronic lumbar back pain. He has a history of transverse myelitis. He is followed by neurosurgery. He takes gabapentin 100mg TID, failed lyrica.    Baker Cyst: Received Cortisone shot from Dr. Bowman last month, pain has been much improved.    Review of Systems   Constitutional:  Negative for chills and fever.   HENT:  Negative for congestion and sore throat.    Respiratory:  Negative for cough and shortness of breath.    Cardiovascular:  Negative for chest pain.   Gastrointestinal:  Negative for abdominal pain, diarrhea, nausea and vomiting.   Genitourinary:  Negative for dysuria and hematuria.   Musculoskeletal:  Positive for arthralgias.   Neurological:  Negative for dizziness, syncope, light-headedness and numbness.       Objective:      Physical Exam  Vitals reviewed.   Constitutional:       General: He is not in acute distress.  HENT:      Head: Normocephalic and atraumatic.      Mouth/Throat:      Mouth: Mucous membranes are moist.   Eyes:      Conjunctiva/sclera: Conjunctivae normal.   Cardiovascular:      Rate and Rhythm: Normal rate and regular rhythm.      Heart sounds: Normal heart sounds. No murmur heard.  Pulmonary:      Effort: Pulmonary effort is normal. No respiratory distress.      Breath sounds: Normal breath sounds.   Musculoskeletal:         General: No deformity.      Cervical back: Neck supple.      Right knee: Swelling present.      Left knee: Normal.      Comments: Baker cyst visible and palpable   Skin:     General: Skin is warm and dry.   Neurological:      Mental Status: He is alert and oriented to person, place, and time.    Psychiatric:         Mood and Affect: Mood normal.         Behavior: Behavior normal.         Assessment:       1. Prediabetes    2. Lumbar radiculopathy, chronic    3. Vitamin D deficiency    4. Low testosterone in male    5. Abnormal TSH            Plan:           1. Prediabetes    2. Lumbar radiculopathy, chronic    3. Vitamin D deficiency    4. Low testosterone in male    5. Abnormal TSH        Labs as ordered  Cont current meds  Follow-up neurosurgery as scheduled  RTC 6 months or sooner if needed

## 2025-04-01 NOTE — PROGRESS NOTES
Subjective:       Patient ID: Antonio Au Jr. is a 55 y.o. male.    Chief Complaint: Follow-up (Patient is here today for a follow up visit. )    Pt here for follow-up    Testosterone def: Pt is doing well with meds. He is due for labs.     Obesity/pre-DM: he lost 3lb since last visit. Last A1c 5.7     Vit D def: Pt currently on vit D supplements.    Chronic lumbar back pain. He has a history of transverse myelitis. He is followed by neurosurgery. He takes gabapentin 100mg TID, failed lyrica.    Since his last visit, he was seen by ortho and diagnosed with baker's cyst. He underwent steroid injection with ortho.    Review of Systems   Constitutional:  Negative for chills and fever.   HENT:  Negative for congestion and sore throat.    Respiratory:  Negative for cough and shortness of breath.    Cardiovascular:  Negative for chest pain.   Gastrointestinal:  Negative for abdominal pain, diarrhea, nausea and vomiting.   Genitourinary:  Negative for dysuria and hematuria.   Musculoskeletal:  Positive for arthralgias.   Neurological:  Positive for numbness. Negative for dizziness, syncope and light-headedness.       Objective:      Physical Exam  Vitals reviewed.   Constitutional:       General: He is not in acute distress.  HENT:      Head: Normocephalic and atraumatic.      Mouth/Throat:      Mouth: Mucous membranes are moist.   Eyes:      Conjunctiva/sclera: Conjunctivae normal.   Cardiovascular:      Rate and Rhythm: Normal rate and regular rhythm.      Heart sounds: Normal heart sounds. No murmur heard.  Pulmonary:      Effort: Pulmonary effort is normal. No respiratory distress.      Breath sounds: Normal breath sounds.   Musculoskeletal:         General: No deformity.      Cervical back: Neck supple.      Comments: Suspected palpable venous cord right inner thigh just superior to knee   Neurological:      Mental Status: He is alert and oriented to person, place, and time.   Psychiatric:         Mood and  Affect: Mood normal.         Behavior: Behavior normal.         Assessment:       1. Prediabetes    2. Lumbar radiculopathy, chronic    3. Vitamin D deficiency    4. Low testosterone in male    5. Abnormal TSH    6. Demyelinating disease of central nervous system, unspecified            Plan:           1. Prediabetes  -     CBC Auto Differential; Future; Expected date: 04/01/2025  -     Comprehensive Metabolic Panel; Future; Expected date: 04/01/2025  -     Hemoglobin A1C; Future; Expected date: 04/01/2025    2. Lumbar radiculopathy, chronic    3. Vitamin D deficiency  -     CBC Auto Differential; Future; Expected date: 04/01/2025  -     Comprehensive Metabolic Panel; Future; Expected date: 04/01/2025  -     Vitamin D; Future; Expected date: 04/01/2025    4. Low testosterone in male  -     CBC Auto Differential; Future; Expected date: 04/01/2025  -     Comprehensive Metabolic Panel; Future; Expected date: 04/01/2025  -     Testosterone; Future; Expected date: 04/01/2025  -     testosterone cypionate (DEPOTESTOTERONE CYPIONATE) 200 mg/mL injection; Inject 0.5 mLs (100 mg total) into the muscle every 14 (fourteen) days.  Dispense: 1 mL; Refill: 5    5. Abnormal TSH  -     CBC Auto Differential; Future; Expected date: 04/01/2025  -     Comprehensive Metabolic Panel; Future; Expected date: 04/01/2025  -     TSH; Future; Expected date: 04/01/2025    6. Demyelinating disease of central nervous system, unspecified    Labs as ordered  Cont current meds, refills sent  Follow-up ortho as scheduled  Follow-up neurosurgery as scheduled  RTC 6 months or sooner if needed

## 2025-04-07 ENCOUNTER — LAB VISIT (OUTPATIENT)
Dept: LAB | Facility: HOSPITAL | Age: 55
End: 2025-04-07
Attending: STUDENT IN AN ORGANIZED HEALTH CARE EDUCATION/TRAINING PROGRAM
Payer: COMMERCIAL

## 2025-04-07 DIAGNOSIS — E55.9 VITAMIN D DEFICIENCY: ICD-10-CM

## 2025-04-07 DIAGNOSIS — R79.89 LOW TESTOSTERONE IN MALE: ICD-10-CM

## 2025-04-07 DIAGNOSIS — R73.03 PREDIABETES: ICD-10-CM

## 2025-04-07 DIAGNOSIS — R79.89 ABNORMAL TSH: ICD-10-CM

## 2025-04-07 LAB
25(OH)D3+25(OH)D2 SERPL-MCNC: 26 NG/ML (ref 30–96)
ABSOLUTE EOSINOPHIL (OHS): 0.19 K/UL
ABSOLUTE MONOCYTE (OHS): 0.62 K/UL (ref 0.3–1)
ABSOLUTE NEUTROPHIL COUNT (OHS): 3.55 K/UL (ref 1.8–7.7)
ALBUMIN SERPL BCP-MCNC: 4.1 G/DL (ref 3.5–5.2)
ALP SERPL-CCNC: 55 UNIT/L (ref 40–150)
ALT SERPL W/O P-5'-P-CCNC: 19 UNIT/L (ref 10–44)
ANION GAP (OHS): 8 MMOL/L (ref 8–16)
AST SERPL-CCNC: 21 UNIT/L (ref 11–45)
BASOPHILS # BLD AUTO: 0.08 K/UL
BASOPHILS NFR BLD AUTO: 1.1 %
BILIRUB SERPL-MCNC: 0.6 MG/DL (ref 0.1–1)
BUN SERPL-MCNC: 19 MG/DL (ref 6–20)
CALCIUM SERPL-MCNC: 9.7 MG/DL (ref 8.7–10.5)
CHLORIDE SERPL-SCNC: 103 MMOL/L (ref 95–110)
CO2 SERPL-SCNC: 30 MMOL/L (ref 23–29)
CREAT SERPL-MCNC: 1 MG/DL (ref 0.5–1.4)
EAG (OHS): 108 MG/DL (ref 68–131)
ERYTHROCYTE [DISTWIDTH] IN BLOOD BY AUTOMATED COUNT: 13 % (ref 11.5–14.5)
GFR SERPLBLD CREATININE-BSD FMLA CKD-EPI: >60 ML/MIN/1.73/M2
GLUCOSE SERPL-MCNC: 102 MG/DL (ref 70–110)
HBA1C MFR BLD: 5.4 % (ref 4–5.6)
HCT VFR BLD AUTO: 46.3 % (ref 40–54)
HGB BLD-MCNC: 15.5 GM/DL (ref 14–18)
IMM GRANULOCYTES # BLD AUTO: 0.02 K/UL (ref 0–0.04)
IMM GRANULOCYTES NFR BLD AUTO: 0.3 % (ref 0–0.5)
LYMPHOCYTES # BLD AUTO: 2.87 K/UL (ref 1–4.8)
MCH RBC QN AUTO: 28.9 PG (ref 27–31)
MCHC RBC AUTO-ENTMCNC: 33.5 G/DL (ref 32–36)
MCV RBC AUTO: 86 FL (ref 82–98)
NUCLEATED RBC (/100WBC) (OHS): 0 /100 WBC
PLATELET # BLD AUTO: 281 K/UL (ref 150–450)
PMV BLD AUTO: 9.6 FL (ref 9.2–12.9)
POTASSIUM SERPL-SCNC: 4.2 MMOL/L (ref 3.5–5.1)
PROT SERPL-MCNC: 7.2 GM/DL (ref 6–8.4)
RBC # BLD AUTO: 5.37 M/UL (ref 4.6–6.2)
RELATIVE EOSINOPHIL (OHS): 2.6 %
RELATIVE LYMPHOCYTE (OHS): 39.2 % (ref 18–48)
RELATIVE MONOCYTE (OHS): 8.5 % (ref 4–15)
RELATIVE NEUTROPHIL (OHS): 48.3 % (ref 38–73)
SODIUM SERPL-SCNC: 141 MMOL/L (ref 136–145)
TSH SERPL-ACNC: 3.18 UIU/ML (ref 0.4–4)
WBC # BLD AUTO: 7.33 K/UL (ref 3.9–12.7)

## 2025-04-07 PROCEDURE — 85025 COMPLETE CBC W/AUTO DIFF WBC: CPT

## 2025-04-07 PROCEDURE — 83036 HEMOGLOBIN GLYCOSYLATED A1C: CPT

## 2025-04-07 PROCEDURE — 80053 COMPREHEN METABOLIC PANEL: CPT

## 2025-04-07 PROCEDURE — 84443 ASSAY THYROID STIM HORMONE: CPT

## 2025-04-07 PROCEDURE — 82306 VITAMIN D 25 HYDROXY: CPT

## 2025-04-08 ENCOUNTER — RESULTS FOLLOW-UP (OUTPATIENT)
Dept: FAMILY MEDICINE | Facility: CLINIC | Age: 55
End: 2025-04-08

## 2025-04-20 ENCOUNTER — PATIENT MESSAGE (OUTPATIENT)
Dept: ADMINISTRATIVE | Facility: HOSPITAL | Age: 55
End: 2025-04-20
Payer: COMMERCIAL

## 2025-04-21 DIAGNOSIS — Z12.11 SCREENING FOR COLON CANCER: ICD-10-CM

## 2025-05-13 ENCOUNTER — RESULTS FOLLOW-UP (OUTPATIENT)
Dept: FAMILY MEDICINE | Facility: CLINIC | Age: 55
End: 2025-05-13